# Patient Record
Sex: FEMALE | Race: BLACK OR AFRICAN AMERICAN | Employment: FULL TIME | URBAN - METROPOLITAN AREA
[De-identification: names, ages, dates, MRNs, and addresses within clinical notes are randomized per-mention and may not be internally consistent; named-entity substitution may affect disease eponyms.]

---
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
## 2023-07-03 ENCOUNTER — HOSPITAL ENCOUNTER (EMERGENCY)
Facility: HOSPITAL | Age: 23
Discharge: HOME | End: 2023-07-03
Attending: EMERGENCY MEDICINE
Payer: MEDICAID

## 2023-07-03 VITALS
TEMPERATURE: 98 F | OXYGEN SATURATION: 98 % | RESPIRATION RATE: 12 BRPM | HEART RATE: 82 BPM | DIASTOLIC BLOOD PRESSURE: 71 MMHG | SYSTOLIC BLOOD PRESSURE: 111 MMHG | HEIGHT: 67 IN | BODY MASS INDEX: 20.4 KG/M2 | WEIGHT: 130 LBS

## 2023-07-03 DIAGNOSIS — F10.920 ALCOHOLIC INTOXICATION WITHOUT COMPLICATION: Primary | ICD-10-CM

## 2023-07-03 LAB — POCT GLUCOSE: 103 MG/DL (ref 70–110)

## 2023-07-03 PROCEDURE — 82962 GLUCOSE BLOOD TEST: CPT

## 2023-07-03 PROCEDURE — 99283 EMERGENCY DEPT VISIT LOW MDM: CPT

## 2023-07-03 RX ORDER — AZITHROMYCIN 250 MG/1
500 TABLET, FILM COATED ORAL 2 TIMES DAILY
COMMUNITY
Start: 2023-06-16

## 2023-07-03 RX ORDER — FLUCONAZOLE 150 MG/1
150 TABLET ORAL ONCE
COMMUNITY
Start: 2023-06-16

## 2023-07-03 RX ORDER — DOXYCYCLINE 100 MG/1
100 CAPSULE ORAL 2 TIMES DAILY
COMMUNITY
Start: 2023-06-03

## 2023-07-03 NOTE — PROVIDER PROGRESS NOTES - EMERGENCY DEPT.
Encounter Date: 7/3/2023    ED Physician Progress Notes            **This is an assumption of care note**    Case accepted from Dr. Purcell at at 0600, pending ride     This is a 22-year-old female who [place].  Family members can not pick her up until the morning.      Patient was clinically sober walking around the emergency department.  Awaiting ride for home.    0700 patient's mother is here to pick her up.  She is clinically sober.  Appropriate for discharge home.      The encounter diagnosis was Alcoholic intoxication without complication.     Follow-up Information       Schedule an appointment as soon as possible for a visit  with TEDDY Denton.    Specialty: Family Medicine  Contact information:  [address]  [facility] [id number] [phone]

## 2023-07-03 NOTE — ED NOTES
Pt ambulates with no assistance and has a steady gait. Pt is a&oX4 . Pts mother is present to claim responsibility for her and bring her home.

## 2023-07-03 NOTE — ED PROVIDER NOTES
Encounter Date: 7/3/2023       History     Chief Complaint   Patient presents with    Alcohol Intoxication     PT found stumbling [place] and bystanders called EMS after family members would not come get her. PT's mother stated she will come for in the morning.      HPI    22-year-old female presents the ER brought in by EMS for alcohol intoxication.  Patient was found intoxicated [place].  EMS was activated, they were unable to get a family member to pick her up and she was transported to the ER for further evaluation.  EMS gave patient Zofran and IV fluids her blood sugar was in the 120s.  No history of trauma no signs of trauma.    Review of patient's allergies indicates:  No Known Allergies  No past medical history on file.  No past surgical history on file.  No family history on file.     Review of Systems   Reason unable to perform ROS: Patient intoxicated.     Physical Exam     Initial Vitals   BP Pulse Resp Temp SpO2   07/03/23 0434 07/03/23 0434 07/03/23 0434 07/03/23 0447 07/03/23 0434   120/82 98 20 97.9 °F (36.6 °C) 100 %      MAP       --                Physical Exam    Nursing note and vitals reviewed.  Constitutional: She appears well-developed and well-nourished. No distress.   Strong scent of ETOH   HENT:   Head: Normocephalic and atraumatic.   Cardiovascular:  Normal rate and regular rhythm.           Pulmonary/Chest: No respiratory distress.   Abdominal: Abdomen is soft. She exhibits no distension. There is no abdominal tenderness.     Neurological:   Hyper somnolent arousable clinically intoxicated moves extremities spontaneously   Skin: Skin is warm and dry. Capillary refill takes less than 2 seconds.       ED Course   Procedures  Labs Reviewed   POCT GLUCOSE          Imaging Results    None          Medications - No data to display  Medical Decision Making:   Initial Assessment:   22-year-old female presents the ER for evaluation of alcohol intoxication.  She was found  intoxicated by herself, EMS was unable to get family to pick her up and she was transported to the ER for her safety.  She is clinically intoxicated no signs of trauma.  Will check blood glucose level, patient given fluids and Zofran by EMS will observe patient took clinically sober and able to get family member to pick her up.                        Clinical Impression:   Final diagnoses:  [F10.920] Alcoholic intoxication without complication (Primary)        ED Disposition Condition    Discharge Stable          ED Prescriptions    None       Follow-up Information       Follow up With Specialties Details Why Contact Info    [facility] Schedule an appointment as soon as possible for a visit   [address] [place] [address]  [facility] [place] [address]  [phone]               Jigar Purcell MD  07/04/23 0031

## 2023-07-03 NOTE — ED TRIAGE NOTES
Olga Cassidy, a 22 y.o. female presents to the ED w/ complaint of ETOH intoxication.     Triage note:  Chief Complaint   Patient presents with    Alcohol Intoxication     [place] and bystanders called EMS after family members would not come get her. PT's mother stated she will come for in the morning.      Review of patient's allergies indicates:  No Known Allergies  No past medical history on file.

## 2023-07-03 NOTE — DISCHARGE INSTRUCTIONS
Additional instructions  Followup with your primary care physician in 2-3 days if you are not improving. Take all your medications as prescribed. Return to the emergency department if you have increasing pain, chest pain, difficulty breathing,  nonstop vomiting, or any other concerns. Be sure to drink plenty of fluids to stay hydrated. Get plenty of rest. Please refer to additional educational material for further instructions.    If your blood pressure was over > 120/80 without history of hypertension you are advised to follow up with your PCP.  While elevated Blood pressures can be caused by many things including just coming to the emergency department, you will need to follow up with your primary care physician for further evaluation ideally in 2-3 days.         Here are resources for rehab should you need them:      [facility]  [address], [place]   [phone]  Primary treatment, independent living, and long-term aftercare for men    [facility]  [address], [place]   [phone]  6-12 month, jolene-based program for men    [facility]  [address]  [place], [place]   [phone]  Intensive Outpatient program (men & women)  Residential Treatment 5-6 weeks (men)  Supportive Housing  [email or web address]    [facility]  [place]ol[place]-based residential treatment program for adult men for up to 6 months.  [address] [address]  [phone]    [facility]  Residential treatment program for adult women up to 6 months.  [address] [facility], [place] [address]  [address] [facility], [place] [address]  [phone]    [place], [place]   Admissions Coordinator: Rick Bay  [phone]  Provides transportation to the [facility]  Residential 60-day program for [place] residents receiving less than $23,000/year.  [address]  [facility] [place] [address] [phone]    [facility]  BERNA Pepe Parrish [phone]  Rev. Sridharbrandie [phone] (admin)    [place] [address] [address] [place] [id number] [phone]    [facility]  [email or web address]  Residential substance abuse treatment for men, women, women with children, and pregnant women. If dual diagnosis, they must have current psych eval & 30 days worth of medications. Transportation to mental health clinic provided.  [address] [facility], [facility] [address]  [phone] (short-term residential)  [phone] (detox, REQUIRED for alcohol, benzodiazepines, & heroin use)    [facility]  Men only, program for alcoholics  [address] [facility]  [phone]    [facility]  [place]-based residential substance abuse treatment program for men for up to 12 months  [address] [facility], [facility] [id number] [phone]    [facility]-based program for men. Some restrictions on psych medications.  [address] [place] [address] [phone]    [facility]-based program for women. Some restrictions on psych medications.  [address] [place] [address] [phone]    [facility] ([facility]) for males &  [facility] Drug Unit ([facility]) for females  [address], [place] [address]  [phone]    [facility]  [address], [place], [place] [id number] [phone]    [place] ([place], [place])  [address] [place] [address] [phone]    [facility] ([place], [place])  [phone]  Accepts Medicaid, uninsured, and many private insurances.   Usually has a waiting list, call for more info.     Intensive Outpatient Programs for Substance Use    [facility]  Accepts all 5 Medicaid plans and private insurance.   [address], [place], [place] [id number] [phone] [address] [place] [address] [phone]  [facility] [place] [address]  [phone]    [facility] [place] ([facility])   Accepts private insurances. [facility] location accepts Medicaid.   [address] [place] [address]   [phone]   [address] [facility], [address], [place] [address] [phone]   [address] [facility], [facility] [address] [place] [address]   [phone]     [facility] ([facility])  Serves [facility] residents.  Serves uninsured patients, those with Medicaid and some private plans.  New patients & aftercare appointments available as walk-in.  Primary care services available as well.  [facility]: [address] [facility] [address] [place] [address];   [phone]  [place]: [address] [place] [address];   [phone]  24/7 Crisis Line: [phone]    [facility]  Serves uninsured patients & those with Medicaid, call for more info.  Primary care, pediatrics, HIV treatment, and dentistry services available as well.   Three locations.  [phone]    [facility]  Accepts Medicaid.  [address] [facility] [place], [address]  [place] [address] [phone]   Monday, Wednesday, Fridays 9:00AM-12:00PM  Provides bus tokens and breakfast.    [facility] ([facility])  Accepts Medicaid and uninsured individuals (uninsured must call [phone] for funding assistance)   [phone] [address] [facility], [address], [place] [address]    [facility]   Private pay or private insurances, including Blue Cross Blue Shield.   [phone] [address] [place] [address]    [facility]  Private pay or private insurances  [address] ([facility])  [phone]    Alcoholics Anonymous  Go to www.[place].org for locations and times  Locations with multiple meetings per day:  [place] - [address] ([facility])  [facility] - [address] [facility] ([facility])  [facility] - [address] ([place])  [place] - [address] ([place])    For more referrals and outpatient treatment, please contact [facility], [phone].    [facility]    [facility]  Serves [place], [facility], and [facility] residents.  Serves uninsured patients & those with Medicaid.  Main location: [address] [place], [place] [id number] [phone]  Walk-in Hours: Monday, Wednesday, Thursday 8:30-10:00 am & 10:30-12 noon  24/7 Crisis Line: [phone]    [facility]  Serves [facility].  Serves uninsured patients, those with Medicaid and some private plans.  New patients & aftercare appointments available as walk-in.  Primary care services available as well.  [facility]: [address] [facility] [place] [address];   [phone]  [place]: [address] [place] [address];   [phone]  24/7 Crisis Line: [phone]    [facility]  Serves uninsured patients & those with Medicaid, call for more info.  Primary care, pediatrics, HIV treatment, and dentistry services available as well.   Three locations.  [phone]    [facility]  Serves patients with Medicaid, Medicare, and private insurance  [address]  [place], [place] [id number] [phone]    [facility]  Serves uninsured patients & those with Medicaid, private insurances.  Primary care available as well.   [phone]  [facility] [place] [address]    [facility]  Serves veterans who were honorably discharged.  [address] [place], [place] [id number] [phone]   24/7 Veterans Crisis Line: [phone] (Press 1)    If you have private insurance and need to find a specialist, please contact your insurance network to request a list of providers covered by your benefits.  Low Cost  Counseling    [facility]  General counseling, anger management, among other services  Accepts Medicaid and enroll pts in Medicaid  [address]  [phone]    [facility]  Counseling and support groups for patients and their loved ones  [address] [place] [address]  [phone]), Monday-Friday, 10 a.m.- 6 p.m.    [facility]  Accepts Medicaid and sliding scale  [address]  [phone]    [facility]  Locations in [place], [place], [place], [place] and [place]  Call [phone] to make an appointment at any location    [facility]  [address]  [place], [place] [address] [phone]    [facility]  [address] [place], [place] [address]  [phone]    For DBT specifically:  [facility]  Private insurance but does do sliding scale  [address] [facility] [address]  [place], [place]  [phone]    For loved ones of patients with chronic mental illness:  Behavioral Health Family Support and Education Group  1st Wednesday of every month, 5:30-6:30pm  [facility] Room B  Free of charge, refreshments will be provided

## 2025-04-23 PROCEDURE — 86480 TB TEST CELL IMMUN MEASURE: CPT | Performed by: EMERGENCY MEDICINE

## 2025-04-23 PROCEDURE — 86706 HEP B SURFACE ANTIBODY: CPT | Performed by: EMERGENCY MEDICINE

## 2025-04-23 PROCEDURE — 86735 MUMPS ANTIBODY: CPT | Performed by: EMERGENCY MEDICINE

## 2025-04-23 PROCEDURE — 86762 RUBELLA ANTIBODY: CPT | Performed by: EMERGENCY MEDICINE

## 2025-04-23 PROCEDURE — 86765 RUBEOLA ANTIBODY: CPT | Performed by: EMERGENCY MEDICINE

## 2025-04-23 PROCEDURE — 86787 VARICELLA-ZOSTER ANTIBODY: CPT | Performed by: EMERGENCY MEDICINE

## (undated) DEVICE — APPLICATOR BNDG 1MM ADH PREMIERPRO EXOFIN

## (undated) DEVICE — STERILE POLYISOPRENE POWDER-FREE SURGICAL GLOVES WITH EMOLLIENT COATING: Brand: PROTEXIS

## (undated) DEVICE — DRAPE XR C ARM 41X74IN LF --

## (undated) DEVICE — SOLUTION IV 1000ML 0.9% SOD CHL

## (undated) DEVICE — THREADED GUIDE WIRE

## (undated) DEVICE — STERILE POLYISOPRENE POWDER-FREE SURGICAL GLOVES: Brand: PROTEXIS

## (undated) DEVICE — SUTURE MCRYL SZ 3-0 L27IN ABSRB UD L19MM PS-2 3/8 CIR PRIM Y427H

## (undated) DEVICE — 6619 2 PTNT ISO SYS INCISE AREA&LT;(&GT;&&LT;)&GT;P: Brand: STERI-DRAPE™ IOBAN™ 2

## (undated) DEVICE — SUTURE VCRL SZ 2-0 L36IN ABSRB VLT L36MM CT-1 1/2 CIR J345H

## (undated) DEVICE — STRAP,POSITIONING,KNEE/BODY,FOAM,4X60": Brand: MEDLINE

## (undated) DEVICE — REM POLYHESIVE ADULT PATIENT RETURN ELECTRODE: Brand: VALLEYLAB

## (undated) DEVICE — CANNULATED DRILL

## (undated) DEVICE — SURGICAL PROCEDURE PACK BASIN MAJ SET CUST NO CAUT

## (undated) DEVICE — PREP SKN PREVAIL 40ML APPL --

## (undated) DEVICE — NEEDLE HYPO 22GA L1.5IN BLK S STL HUB POLYPR SHLD REG BVL

## (undated) DEVICE — ROCKER SWITCH PENCIL BLADE ELECTRODE, HOLSTER: Brand: EDGE

## (undated) DEVICE — DRAPE,U/ SHT,SPLIT,PLAS,STERIL: Brand: MEDLINE

## (undated) DEVICE — PACK,BASIC,SIRUS,V: Brand: MEDLINE

## (undated) DEVICE — 4-PORT MANIFOLD: Brand: NEPTUNE 2

## (undated) DEVICE — DRSG AQUACEL SURG 3.5X6IN -- CONVERT TO ITEM 369227